# Patient Record
Sex: MALE | Race: BLACK OR AFRICAN AMERICAN | NOT HISPANIC OR LATINO | Employment: FULL TIME | URBAN - METROPOLITAN AREA
[De-identification: names, ages, dates, MRNs, and addresses within clinical notes are randomized per-mention and may not be internally consistent; named-entity substitution may affect disease eponyms.]

---

## 2023-10-30 ENCOUNTER — OCCUPATIONAL HEALTH (OUTPATIENT)
Dept: URGENT CARE | Facility: CLINIC | Age: 23
End: 2023-10-30
Payer: MEDICAID

## 2023-10-30 DIAGNOSIS — Z00.00 ENCOUNTER FOR PHYSICAL EXAMINATION: Primary | ICD-10-CM

## 2023-10-30 LAB — BREATH ALCOHOL: 0

## 2023-10-30 PROCEDURE — 80305 DRUG TEST PRSMV DIR OPT OBS: CPT | Mod: S$GLB,,, | Performed by: STUDENT IN AN ORGANIZED HEALTH CARE EDUCATION/TRAINING PROGRAM

## 2023-10-30 PROCEDURE — 80305 OOH COLLECTION ONLY DRUG SCREEN: ICD-10-PCS | Mod: S$GLB,,, | Performed by: STUDENT IN AN ORGANIZED HEALTH CARE EDUCATION/TRAINING PROGRAM

## 2023-10-30 PROCEDURE — 82075 POCT BREATH ALCOHOL TEST: ICD-10-PCS | Mod: S$GLB,,, | Performed by: STUDENT IN AN ORGANIZED HEALTH CARE EDUCATION/TRAINING PROGRAM

## 2023-10-30 PROCEDURE — 99499 UNLISTED E&M SERVICE: CPT | Mod: S$GLB,,, | Performed by: STUDENT IN AN ORGANIZED HEALTH CARE EDUCATION/TRAINING PROGRAM

## 2023-10-30 PROCEDURE — 82075 ASSAY OF BREATH ETHANOL: CPT | Mod: S$GLB,,, | Performed by: STUDENT IN AN ORGANIZED HEALTH CARE EDUCATION/TRAINING PROGRAM

## 2023-10-30 PROCEDURE — 99499 PHYSICAL, BASIC COMPLEXITY: ICD-10-PCS | Mod: S$GLB,,, | Performed by: STUDENT IN AN ORGANIZED HEALTH CARE EDUCATION/TRAINING PROGRAM

## 2024-03-28 ENCOUNTER — OFFICE VISIT (OUTPATIENT)
Dept: URGENT CARE | Facility: CLINIC | Age: 24
End: 2024-03-28
Payer: MEDICAID

## 2024-03-28 VITALS
WEIGHT: 172 LBS | HEIGHT: 72 IN | RESPIRATION RATE: 17 BRPM | BODY MASS INDEX: 23.3 KG/M2 | DIASTOLIC BLOOD PRESSURE: 80 MMHG | HEART RATE: 81 BPM | SYSTOLIC BLOOD PRESSURE: 121 MMHG | OXYGEN SATURATION: 98 % | TEMPERATURE: 97 F

## 2024-03-28 DIAGNOSIS — S81.052A: Primary | ICD-10-CM
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
## 2024-03-28 PROCEDURE — 99213 OFFICE O/P EST LOW 20 MIN: CPT | Mod: 25,S$GLB,,

## 2024-03-28 PROCEDURE — 90715 TDAP VACCINE 7 YRS/> IM: CPT | Mod: S$GLB,,,

## 2024-03-28 PROCEDURE — 90471 IMMUNIZATION ADMIN: CPT | Mod: S$GLB,,,

## 2024-03-28 RX ORDER — AMOXICILLIN AND CLAVULANATE POTASSIUM 875; 125 MG/1; MG/1
1 TABLET, FILM COATED ORAL EVERY 12 HOURS
Qty: 14 TABLET | Refills: 0 | Status: SHIPPED | OUTPATIENT
Start: 2024-03-28 | End: 2024-04-04

## 2024-03-28 RX ORDER — MUPIROCIN 20 MG/G
OINTMENT TOPICAL 3 TIMES DAILY
Qty: 15 G | Refills: 0 | Status: SHIPPED | OUTPATIENT
Start: 2024-03-28 | End: 2024-04-04

## 2024-03-28 NOTE — LETTER
March 28, 2024      [facility] [address]  [place] [address]  Phone: [phone]  Fax: [phone]       Patient: Miguel Sarabia   YOB: 2000  Date of Visit: 03/28/2024    To Whom It May Concern:    Steve Sarabia  was at [facility] on 03/28/2024. The patient may return to work/school on Friday, March 29th, 2024 with no restrictions. If you have any questions or concerns, or if I can be of further assistance, please do not hesitate to contact me.    Sincerely,    Matthew Urena PA-C

## 2024-03-28 NOTE — PROGRESS NOTES
Subjective:      Patient ID: Miguel Sarabia III is a 23 y.o. male.    Vitals:  height is 6' (1.829 m) and weight is 78 kg (172 lb). His oral temperature is 97.3 °F (36.3 °C). His blood pressure is 121/80 and his pulse is 81. His respiration is 17 and oxygen saturation is 98%.     Chief Complaint: Animal Bite    Pt is a 24 yo male presenting with dog bite on R knee. Onset of symptoms was today while in the street visiting a friend. He is having mild pain with walking. Denies any blood or discharge, fever, chills myalgia. Pt reports using no OTC medication.    Animal Bite   The incident occurred today. The incident occurred in the street. There is an injury to the Right knee. The patient is experiencing no pain. It is unlikely that a foreign body is present. Associated symptoms include inability to bear weight and pain when bearing weight. Pertinent negatives include no chest pain, no fussiness, no numbness, no visual disturbance, no abdominal pain, no bowel incontinence, no nausea, no vomiting, no bladder incontinence, no headaches, no hearing loss, no neck pain, no light-headedness, no tingling, no cough and no difficulty breathing.     Constitution: Negative for activity change, appetite change, chills, fatigue and fever.   HENT:  Negative for ear pain, hearing loss, congestion, postnasal drip, sinus pain, sinus pressure and sore throat.    Neck: Negative for neck pain and neck swelling.   Cardiovascular:  Negative for chest pain and sob on exertion.   Eyes:  Negative for eye trauma, eye discharge and eye redness.   Respiratory:  Negative for cough, shortness of breath and wheezing.    Gastrointestinal:  Negative for abdominal pain, nausea, vomiting, constipation, diarrhea and bowel incontinence.   Genitourinary:  Negative for dysuria, frequency, urgency, urine decreased and bladder incontinence.   Musculoskeletal:  Negative for pain, joint pain, joint swelling, abnormal ROM of joint and muscle ache.   Skin:   Positive for puncture wound. Negative for color change, rash, laceration and erythema.   Neurological:  Negative for dizziness, light-headedness, headaches, altered mental status and numbness.   Psychiatric/Behavioral:  Negative for altered mental status and confusion.       Objective:     Physical Exam   Constitutional: He is oriented to person, place, and time. He appears well-developed. He is cooperative.  Non-toxic appearance. He does not appear ill. No distress.      Comments:Pt sitting erect on examination table. No acute respiratory distress, no use of accessory muscles, no notice of nasal flaring.        HENT:   Head: Normocephalic and atraumatic.   Ears:   Right Ear: Hearing, tympanic membrane, external ear and ear canal normal.   Left Ear: Hearing, tympanic membrane, external ear and ear canal normal.   Nose: Nose normal. No mucosal edema, rhinorrhea, nasal deformity or congestion. No epistaxis. Right sinus exhibits no maxillary sinus tenderness and no frontal sinus tenderness. Left sinus exhibits no maxillary sinus tenderness and no frontal sinus tenderness.   Mouth/Throat: Uvula is midline, oropharynx is clear and moist and mucous membranes are normal. Mucous membranes are moist. No trismus in the jaw. Normal dentition. No uvula swelling. No oropharyngeal exudate, posterior oropharyngeal edema or posterior oropharyngeal erythema. Oropharynx is clear.   Eyes: Conjunctivae and lids are normal. Pupils are equal, round, and reactive to light. No scleral icterus. Extraocular movement intact   Neck: Trachea normal and phonation normal. Neck supple. No edema present. No erythema present. No neck rigidity present.   Cardiovascular: Normal rate, regular rhythm, normal heart sounds and normal pulses.   Pulmonary/Chest: Effort normal and breath sounds normal. No accessory muscle usage. No apnea, no tachypnea and no bradypnea. No respiratory distress. He has no decreased breath sounds. He has no rhonchi.    Abdominal: Normal appearance.   Musculoskeletal: Normal range of motion.         General: No deformity. Normal range of motion.      Right knee: He exhibits normal range of motion. Tenderness found.      Left hand: He exhibits laceration.        Legs:       Comments: Puncture wounds on R anterior knee  Mild redness surrounding wounds   Neurological: He is alert and oriented to person, place, and time. He exhibits normal muscle tone. Coordination normal.   Skin: Skin is warm, dry, intact, not diaphoretic and not pale. Lacerations - upper ext.:  left handNo erythema   Psychiatric: His speech is normal and behavior is normal. Judgment and thought content normal.   Nursing note and vitals reviewed.      Assessment:     1. Dog bite of knee, left, initial encounter        Plan:   I have reviewed the patient chart and pertinent past imaging/labs.      Dog bite of knee, left, initial encounter  -     (In Office Administered) Tdap Vaccine  -     mupirocin (BACTROBAN) 2 % ointment; Apply topically 3 (three) times daily. for 7 days  Dispense: 15 g; Refill: 0  -     amoxicillin-clavulanate 875-125mg (AUGMENTIN) 875-125 mg per tablet; Take 1 tablet by mouth every 12 (twelve) hours. for 7 days  Dispense: 14 tablet; Refill: 0

## 2024-03-28 NOTE — PATIENT INSTRUCTIONS
Apply mupirocin twice daily for 7 days  Augmentin twice daily for 7 days with food  Monitor for worsening of redness or pain, discharge, fever, chills, new symptoms  Please return here or go to the Emergency Department for any concerns or worsening of condition.  If you were prescribed antibiotics, please take them to completion.  If you were prescribed a narcotic medication, do not drive or operate heavy equipment or machinery while taking these medications.  Please follow up with your primary care doctor or specialist as needed.    If you  smoke, please stop smoking.

## 2024-05-08 ENCOUNTER — OFFICE VISIT (OUTPATIENT)
Dept: URGENT CARE | Facility: CLINIC | Age: 24
End: 2024-05-08
Payer: COMMERCIAL

## 2024-05-08 ENCOUNTER — OCCUPATIONAL HEALTH (OUTPATIENT)
Dept: URGENT CARE | Facility: CLINIC | Age: 24
End: 2024-05-08
Payer: COMMERCIAL

## 2024-05-08 VITALS
HEART RATE: 60 BPM | TEMPERATURE: 98 F | DIASTOLIC BLOOD PRESSURE: 82 MMHG | RESPIRATION RATE: 20 BRPM | HEIGHT: 70 IN | WEIGHT: 185 LBS | BODY MASS INDEX: 26.48 KG/M2 | SYSTOLIC BLOOD PRESSURE: 123 MMHG | OXYGEN SATURATION: 98 %

## 2024-05-08 DIAGNOSIS — S39.012A STRAIN OF LUMBAR REGION, INITIAL ENCOUNTER: Primary | ICD-10-CM

## 2024-05-08 DIAGNOSIS — Z02.83 ENCOUNTER FOR DRUG SCREENING: Primary | ICD-10-CM

## 2024-05-08 DIAGNOSIS — Z02.6 ENCOUNTER RELATED TO WORKER'S COMPENSATION CLAIM: ICD-10-CM

## 2024-05-08 LAB — BREATH ALCOHOL: 0

## 2024-05-08 PROCEDURE — 99203 OFFICE O/P NEW LOW 30 MIN: CPT | Mod: S$GLB,,, | Performed by: SURGERY

## 2024-05-08 PROCEDURE — 80305 DRUG TEST PRSMV DIR OPT OBS: CPT | Mod: S$GLB,,, | Performed by: SURGERY

## 2024-05-08 PROCEDURE — 82075 ASSAY OF BREATH ETHANOL: CPT | Mod: S$GLB,,, | Performed by: SURGERY

## 2024-05-08 RX ORDER — NAPROXEN 500 MG/1
500 TABLET ORAL 2 TIMES DAILY
Qty: 30 TABLET | Refills: 0 | OUTPATIENT
Start: 2024-05-08 | End: 2024-05-08

## 2024-05-08 RX ORDER — NAPROXEN 500 MG/1
500 TABLET ORAL 2 TIMES DAILY
Qty: 30 TABLET | Refills: 0 | Status: SHIPPED | OUTPATIENT
Start: 2024-05-08

## 2024-05-08 NOTE — PROGRESS NOTES
Subjective:      Patient ID: Miguel Sarabia III is a 23 y.o. male.    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   Musculoskeletal:  Positive for pain, abnormal ROM of joint, back pain, pain with walking, muscle cramps and muscle ache.   Neurological:  Negative for loss of balance, numbness and tingling.     See MA note above. Begin MD note:    Miguel Sarabia III is a RHD 23 y.o. presenting for evaluation of left side back pain. He was using weed-eater to cut [employer] when he felt a tightness in left side of back, from shoulder blade down. He was unable to complete the whole assignment, only completed up to the middle of the back field. He holds the weed-eater with two hands, leading hand depends on which direction he is cutting in, he is not certain if he was holding the weed-eater with left hand in front when he experienced the pain. He does have to bend over while using the weed-eater given his height. He [place] , it is typically alleviated by stretching. Today the tightness was not alleviated by stretching. He denies any history of back injury. No falls, no b/b dysfunction, no saddle anesthesia, no paresthesias. He took aleve for pain prior to presenting to clinic without change in pain symptoms.     Objective:     Physical Exam  Vitals and nursing note reviewed.   Constitutional:       General: He is not in acute distress.     Appearance: He is normal weight. He is not ill-appearing.    HENT:      Head: Normocephalic.   Eyes:      Conjunctiva/sclera: Conjunctivae normal.   Pulmonary:      Effort: Pulmonary effort is normal. No respiratory distress.   Musculoskeletal:      Lumbar back: Tenderness present. Normal range of motion. Negative right straight leg raise test and negative left straight leg raise test.        Back:       Comments: Reclining on exam table with left leg stretched out and right bent over table edge, able to rise without requiring assistance or report of pain. Sits comfortably on side of exam table with knees bent at 90 degrees over the edge. FAROM of lumbar spine with report of pain to left upper back with forward flexion and left side back with left lateral flexion.  Reports pain with lying supine, SLR negative bilaterally.  Unable to roll onto left side for assessment of hip abduction and adduction secondary to pain, reports abdominal soreness has result of workout yesterday. Able to rise from supine without assistance or difficulty. He is able to heel and toe walk with discomfort.  Nontender to palpation of midline spine.  Tender to palpation of left paraspinal musculature from mid scapula to lumbar region.   Skin:     General: Skin is warm.      Coloration: Skin is not pale.   Neurological:      General: No focal deficit present.      Mental Status: He is alert and oriented to person, place, and time.      GCS: GCS eye subscore is 4. GCS verbal subscore is 5. GCS motor subscore is 6.      Motor: Motor function is intact.      Coordination: Coordination is intact.   Psychiatric:         Attention and Perception: Attention normal.         Mood and Affect: Mood normal.         Speech: Speech normal.         Behavior: Behavior normal. Behavior is cooperative.         Thought Content: Thought content normal.          Assessment:      1. Strain of lumbar region, initial encounter    2. Encounter related to worker's compensation claim      Plan:     History consistent with lumbar  strain.  He will start use of naproxen twice daily to address pain symptoms, advised to use heating pad to alleviate muscle tension, and handout back stretches provided.  Activity modification as advised but he would like to continue on regular duty as there is typically no light duty available.  Advised to rest today and begin medications and heating pads and return to work regular duty tomorrow.  He also advised to stretch before and heavy workouts to prevent stiffness and soreness the next day.    Medications Ordered This Encounter   Medications    naproxen (NAPROSYN) 500 MG tablet     Sig: Take 1 tablet (500 mg total) by mouth 2 (two) times daily.     Dispense:  30 tablet     Refill:  0     Diagnoses and plan discussed with the patient, as well as the expected course and duration of symptoms. Risks and benefits of any medication prescribed during this visit was explained, verbal instructions on use given. Clinic/Emergency department return precautions were given, can return to [facility] before scheduled follow-up appointment if notes worsening/aggravation of symptoms. All questions and concerns were addressed prior to discharge. Plan was developed with active input from the patient and they verbalized understanding of and agreement with the POC.  [facility] was informed of any referrals and relevant orders.  Note was dictated with voice recognition software, please excuse any grammatical errors.    I spent a total of 30 minutes on the day of the visit.  This includes face to face time and non-face to face time preparing to see the patient (e.g. review of medical record), obtaining and/or reviewing separately obtained history, documenting clinical information in the electronic or other health record, independently interpreting results and communicating results to the patient/family/caregiver, or care coordinator.    Patient Instructions: Daily home exercises/warm soaks, Apply ice 24-48 hours then apply heat/warm soaks,  Attention not to aggravate affected area   Restrictions: Home today, Regular Duty (RTW 5/9/24)  Follow up in about 8 days (around 5/16/2024).

## 2024-05-08 NOTE — LETTER
[facility] [place]  [address]  [place] [address]  Phone: [phone]  Fax: [phone]  [facility]: [email or web address]    Pt Name: Miguel Sarabia III  Injury Date: 05/08/2024   Employee ID: [id number] Date of First Treatment: 05/08/2024   Company: [employer]      Appointment Time: 10:25 AM Arrived: 10:30am   Provider: Donna Aceves MD Time Out: 11:40am     Office Treatment:   1. Strain of lumbar region, initial encounter    2. Encounter related to worker's compensation claim      Medications Ordered This Encounter   Medications    naproxen (NAPROSYN) 500 MG tablet      Patient Instructions: Daily home exercises/warm soaks, Apply ice 24-48 hours then apply heat/warm soaks, Attention not to aggravate affected area      Restrictions: Home today, Regular Duty (RTW 5/9/24)     Return Appointment: 5/16/2024 at 9:30am.    EC

## 2024-05-16 ENCOUNTER — OFFICE VISIT (OUTPATIENT)
Dept: URGENT CARE | Facility: CLINIC | Age: 24
End: 2024-05-16
Payer: COMMERCIAL

## 2024-05-16 VITALS
TEMPERATURE: 99 F | OXYGEN SATURATION: 97 % | WEIGHT: 185 LBS | SYSTOLIC BLOOD PRESSURE: 107 MMHG | RESPIRATION RATE: 18 BRPM | HEART RATE: 73 BPM | DIASTOLIC BLOOD PRESSURE: 68 MMHG | HEIGHT: 70 IN | BODY MASS INDEX: 26.48 KG/M2

## 2024-05-16 DIAGNOSIS — S39.012D STRAIN OF LUMBAR REGION, SUBSEQUENT ENCOUNTER: Primary | ICD-10-CM

## 2024-05-16 PROCEDURE — 99214 OFFICE O/P EST MOD 30 MIN: CPT | Mod: S$GLB,,, | Performed by: EMERGENCY MEDICINE

## 2024-05-16 NOTE — PROGRESS NOTES
Subjective:      Patient ID: Miguel Sarabia III is a 23 y.o. male.    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 Has been working regular duty without difficulty. Will continue regular duty without restrictions and discharged from occupational health knowing that he may return prn.      Back Pain  This is a new problem. The current episode started 1 to 4 weeks ago. The problem has been rapidly improving since onset. The pain is at a severity of 0/10. The patient is experiencing no pain. Pertinent negatives include no chest pain, dysuria, fever or numbness.       ROS  Constitution: Negative for chills and fever.   HENT:  Negative for postnasal drip, sinus pain and sore throat.    Neck: Negative for neck pain and neck stiffness.   Cardiovascular:  Negative for chest pain and palpitations.   Respiratory:  Negative for cough and shortness of breath.    Genitourinary:  Negative for dysuria and hematuria.   Musculoskeletal:  Positive for muscle ache. Negative for joint pain and back pain.   Skin:  Negative for wound and laceration.   Neurological:  Negative for altered mental status, numbness and tingling.   Psychiatric/Behavioral:  Negative for altered mental status.      Objective:     Physical Exam  Vitals and nursing note reviewed.   Constitutional:       General: He is not in acute distress.     Appearance: He is normal weight. He is not ill-appearing.   HENT:      Head: Normocephalic.   Eyes:      Conjunctiva/sclera: Conjunctivae normal.   Pulmonary:       Effort: Pulmonary effort is normal. No respiratory distress.   Musculoskeletal:      Lumbar back: Tenderness present. Normal range of motion. Negative right straight leg raise test and negative left straight leg raise test.        Back:       Comments: RESOLUTION OF TTP AND NORMAL TWIST, LATERAL BEND, AND ABLE TO FLEX PAST 90 DEGREES. NORMAL GAIT AND UPPER/LOWER EXTREMITY STRENGTH AND SENSATION   Skin:     General: Skin is warm.      Coloration: Skin is not pale.   Neurological:      General: No focal deficit present.      Mental Status: He is alert and oriented to person, place, and time.      GCS: GCS eye subscore is 4. GCS verbal subscore is 5. GCS motor subscore is 6.      Motor: Motor function is intact.      Coordination: Coordination is intact.   Psychiatric:         Attention and Perception: Attention normal.         Mood and Affect: Mood normal.         Speech: Speech normal.         Behavior: Behavior normal. Behavior is cooperative.         Thought Content: Thought content normal.         Assessment:      1. Strain of lumbar region, subsequent encounter      Plan:     Patient reports no pain and mild stiffness much improved from prior visit. Has been working regular duty without difficulty. Will continue regular duty without restrictions and discharged from occupational health knowing that he may return prn.     Patient Instructions: Attention not to aggravate affected area   Restrictions: Regular Duty, Discharged from Occupational Health  Follow up if symptoms worsen or fail to improve.

## 2024-05-16 NOTE — LETTER
[facility] [place]  [address]  [place] [address]  Phone: [phone]  Fax: [phone]  [facility]: [email or web address]    Pt Name: Miguel Sarabia III  Injury Date: 05/08/2024   Employee ID: [id number] Date of First Treatment: 05/16/2024   Company: [employer]      Appointment Time: 09:30 AM Arrived: 09:30 AM   Provider: Donny Chambers MD Time Out:10:43 AM     Office Treatment:   1. Strain of lumbar region, subsequent encounter          Patient Instructions: Attention not to aggravate affected area    Restrictions: Regular Duty, Discharged from Occupational Health     Return Appointment: if symptoms worsen or fail to improve

## 2025-03-07 ENCOUNTER — OFFICE VISIT (OUTPATIENT)
Dept: URGENT CARE | Facility: CLINIC | Age: 25
End: 2025-03-07

## 2025-03-07 VITALS
WEIGHT: 184.94 LBS | HEART RATE: 72 BPM | DIASTOLIC BLOOD PRESSURE: 70 MMHG | HEIGHT: 70 IN | RESPIRATION RATE: 18 BRPM | OXYGEN SATURATION: 99 % | SYSTOLIC BLOOD PRESSURE: 108 MMHG | TEMPERATURE: 98 F | BODY MASS INDEX: 26.48 KG/M2

## 2025-03-07 DIAGNOSIS — M79.602 PAIN OF LEFT UPPER EXTREMITY: Primary | ICD-10-CM

## 2025-03-07 RX ORDER — METHOCARBAMOL 750 MG/1
750 TABLET, FILM COATED ORAL 3 TIMES DAILY PRN
Qty: 21 TABLET | Refills: 0 | Status: SHIPPED | OUTPATIENT
Start: 2025-03-07

## 2025-03-07 RX ORDER — NAPROXEN 500 MG/1
500 TABLET ORAL 2 TIMES DAILY PRN
Qty: 20 TABLET | Refills: 0 | Status: SHIPPED | OUTPATIENT
Start: 2025-03-07

## 2025-03-07 NOTE — PATIENT INSTRUCTIONS

## 2025-03-07 NOTE — LETTER
March 7, 2025      [facility] [address]  [place] [address]  Phone: [phone]  Fax: [phone]       Patient: Miguel Sarabia   YOB: 2000  Date of Visit: 03/07/2025    To Whom It May Concern:    Steve Sarabia  was at [facility] on 03/07/2025. The patient may return to work/school on 03/09/2025 PM  with no restrictions. If you have any questions or concerns, or if I can be of further assistance, please do not hesitate to contact me.    Sincerely,    Shae Galarza MA

## 2025-03-07 NOTE — PROGRESS NOTES
"Subjective:      Patient ID: Miguel Sarabia III is a 24 y.o. male.    Vitals:  height is 5' 10" (1.778 m) and weight is 83.9 kg (184 lb 15.5 oz). His oral temperature is 98.2 °F (36.8 °C). His blood pressure is 108/70 and his pulse is 72. His respiration is 18 and oxygen saturation is 99%.     Chief Complaint: Arm Pain    24 yr old male presents to the Urgent Care with complaint of left arm pain s/p working out x 3 days. Pain with extending arm. Home tx: heating pad, ibuprofen.    Arm Pain   His dominant hand is their right hand. The incident occurred 3 to 5 days ago. The incident occurred at home. There was no injury mechanism. The pain is present in the left elbow, left fingers and left forearm. The quality of the pain is described as shooting and stabbing. The pain does not radiate. The pain is at a severity of 8/10. The pain is moderate. The pain has been Constant since the incident. Pertinent negatives include no chest pain. The symptoms are aggravated by movement and lifting. He has tried NSAIDs, acetaminophen, heat and ice for the symptoms. The treatment provided no relief.       Constitution: Negative for chills, sweating, fatigue and fever.   Cardiovascular:  Negative for chest pain and sob on exertion.   Musculoskeletal:  Positive for joint pain and muscle ache. Negative for pain.      Objective:     Physical Exam   Constitutional: He appears well-developed. He is cooperative.  Non-toxic appearance. He does not appear ill. normal  Neck: Neck supple.   Cardiovascular: Normal rate and regular rhythm.   Pulmonary/Chest: Effort normal.   Abdominal: Normal appearance.   Musculoskeletal:      Left forearm: He exhibits tenderness. He exhibits no bony tenderness, no swelling, no edema, no deformity and no laceration.        Arms:    Neurological: He is alert.   Skin: Skin is warm, dry, intact and not diaphoretic. Capillary refill takes less than 2 seconds.   Psychiatric: He experiences Normal attention. His speech " is normal and behavior is normal. Mood and thought content normal.   Nursing note and vitals reviewed.      Assessment:     1. Pain of left upper extremity      Plan:   Presentation most consistent with Musculoskeletal Pain.     Pain of left upper extremity  -     methocarbamoL (ROBAXIN) 750 MG Tab; Take 1 tablet (750 mg total) by mouth 3 (three) times daily as needed (Muscle relaxant).  Dispense: 21 tablet; Refill: 0  -     naproxen (NAPROSYN) 500 MG tablet; Take 1 tablet (500 mg total) by mouth 2 (two) times daily as needed (Pain).  Dispense: 20 tablet; Refill: 0      Patient Instructions   If you were prescribed a narcotic or controlled medication, do not drive or operate heavy equipment or machinery while taking these medications.  You must understand that you've received an Urgent Care treatment only and that you may be released before all your medical problems are known or treated. You, the patient, will arrange for follow up care as instructed.  Follow up with your PCP or specialty clinic as directed within 2-5 days if not improved or as needed.  You can call [phone] to schedule an appointment with the appropriate provider.  If your condition worsens we recommend that you receive another evaluation at the emergency room immediately or contact your primary medical clinics after hours call service to discuss your concerns.  Please return here or go to the Emergency Department for any concerns or worsening of condition.